# Patient Record
Sex: MALE | NOT HISPANIC OR LATINO
[De-identification: names, ages, dates, MRNs, and addresses within clinical notes are randomized per-mention and may not be internally consistent; named-entity substitution may affect disease eponyms.]

---

## 2019-06-25 ENCOUNTER — APPOINTMENT (OUTPATIENT)
Dept: ORTHOPEDIC SURGERY | Facility: CLINIC | Age: 64
End: 2019-06-25
Payer: COMMERCIAL

## 2019-06-25 DIAGNOSIS — M19.072 PRIMARY OSTEOARTHRITIS, LEFT ANKLE AND FOOT: ICD-10-CM

## 2019-06-25 PROCEDURE — 99204 OFFICE O/P NEW MOD 45 MIN: CPT

## 2019-06-25 PROCEDURE — 73610 X-RAY EXAM OF ANKLE: CPT | Mod: LT

## 2019-06-25 RX ORDER — DICLOFENAC SODIUM 100 MG/1
100 TABLET, FILM COATED, EXTENDED RELEASE ORAL
Qty: 30 | Refills: 2 | Status: ACTIVE | OUTPATIENT
Start: 2019-06-25

## 2019-06-25 NOTE — HISTORY OF PRESENT ILLNESS
[___ yrs] : [unfilled] year(s) ago [de-identified] : Patient reports pain in ankle for one year now with worsening. Pain with walking, standing, and weight bearing. Patient reports swelling.

## 2019-06-25 NOTE — DISCUSSION/SUMMARY
[Medication Risks Reviewed] : Medication risks reviewed [de-identified] : This patient will ice. He'll take diclofenac. Followup in 3 weeks. He may need an MRI. He may need an injection.

## 2019-06-25 NOTE — PHYSICAL EXAM
[de-identified] : Left ankle shows some swelling and tenderness at the talonavicular joint. He does a flat pronated foot. Neurovascular exam is normal. No tenderness over the posterior tibial tendon. [de-identified] : Left ankle x-ray shows talonavicular arthritis

## 2019-06-26 ENCOUNTER — RX RENEWAL (OUTPATIENT)
Age: 64
End: 2019-06-26

## 2019-06-26 RX ORDER — DICLOFENAC SODIUM 100 MG/1
100 TABLET, FILM COATED, EXTENDED RELEASE ORAL
Qty: 30 | Refills: 2 | Status: ACTIVE | COMMUNITY
Start: 2019-06-26 | End: 1900-01-01

## 2019-07-16 ENCOUNTER — APPOINTMENT (OUTPATIENT)
Dept: ORTHOPEDIC SURGERY | Facility: CLINIC | Age: 64
End: 2019-07-16

## 2024-05-08 ENCOUNTER — OFFICE (OUTPATIENT)
Dept: URBAN - METROPOLITAN AREA CLINIC 34 | Facility: CLINIC | Age: 69
Setting detail: OPHTHALMOLOGY
End: 2024-05-08
Payer: COMMERCIAL

## 2024-05-08 DIAGNOSIS — H25.13: ICD-10-CM

## 2024-05-08 DIAGNOSIS — H16.223: ICD-10-CM

## 2024-05-08 PROCEDURE — 99204 OFFICE O/P NEW MOD 45 MIN: CPT | Performed by: OPHTHALMOLOGY

## 2024-05-08 ASSESSMENT — LID EXAM ASSESSMENTS
OS_COMMENTS: SHORTENED GLANDS
OD_COMMENTS: SHORTENED GLANDS

## 2024-05-08 ASSESSMENT — CONFRONTATIONAL VISUAL FIELD TEST (CVF)
OS_FINDINGS: FULL
OD_FINDINGS: FULL

## 2024-06-27 ENCOUNTER — ASC (OUTPATIENT)
Dept: URBAN - METROPOLITAN AREA SURGERY 8 | Facility: SURGERY | Age: 69
Setting detail: OPHTHALMOLOGY
End: 2024-06-27
Payer: COMMERCIAL

## 2024-06-27 DIAGNOSIS — H25.11: ICD-10-CM

## 2024-06-27 PROCEDURE — 66984 XCAPSL CTRC RMVL W/O ECP: CPT | Mod: RT | Performed by: OPHTHALMOLOGY

## 2024-06-28 ENCOUNTER — OFFICE (OUTPATIENT)
Dept: URBAN - METROPOLITAN AREA CLINIC 35 | Facility: CLINIC | Age: 69
Setting detail: OPHTHALMOLOGY
End: 2024-06-28
Payer: COMMERCIAL

## 2024-06-28 DIAGNOSIS — Z96.1: ICD-10-CM

## 2024-06-28 DIAGNOSIS — H25.12: ICD-10-CM

## 2024-06-28 PROBLEM — H40.012 GLAUCOMA SUSPECT, LOW RISK 1-2 FACTORS; LEFT EYE: Status: ACTIVE | Noted: 2024-06-28

## 2024-06-28 PROCEDURE — 92136 OPHTHALMIC BIOMETRY: CPT | Mod: LT | Performed by: OPHTHALMOLOGY

## 2024-06-28 PROCEDURE — 99024 POSTOP FOLLOW-UP VISIT: CPT | Performed by: OPHTHALMOLOGY

## 2024-06-28 ASSESSMENT — CONFRONTATIONAL VISUAL FIELD TEST (CVF)
OS_FINDINGS: FULL
OD_FINDINGS: FULL

## 2024-07-05 ENCOUNTER — ASC (OUTPATIENT)
Dept: URBAN - METROPOLITAN AREA SURGERY 8 | Facility: SURGERY | Age: 69
Setting detail: OPHTHALMOLOGY
End: 2024-07-05
Payer: COMMERCIAL

## 2024-07-05 DIAGNOSIS — H25.12: ICD-10-CM

## 2024-07-05 PROCEDURE — 66984 XCAPSL CTRC RMVL W/O ECP: CPT | Mod: 79,LT | Performed by: OPHTHALMOLOGY

## 2024-07-06 ENCOUNTER — OFFICE (OUTPATIENT)
Dept: URBAN - METROPOLITAN AREA CLINIC 34 | Facility: CLINIC | Age: 69
Setting detail: OPHTHALMOLOGY
End: 2024-07-06
Payer: COMMERCIAL

## 2024-07-06 DIAGNOSIS — Z96.1: ICD-10-CM

## 2024-07-06 PROBLEM — H16.222 DRY EYE SYNDROME K SICCA;  , LEFT EYE: Status: ACTIVE | Noted: 2024-07-06

## 2024-07-06 PROCEDURE — 99024 POSTOP FOLLOW-UP VISIT: CPT | Performed by: OPHTHALMOLOGY

## 2024-07-06 ASSESSMENT — CONFRONTATIONAL VISUAL FIELD TEST (CVF)
OS_FINDINGS: FULL
OD_FINDINGS: FULL

## 2024-08-13 ENCOUNTER — DOCTOR'S OFFICE (OUTPATIENT)
Age: 69
Setting detail: OPHTHALMOLOGY
End: 2024-08-13
Payer: COMMERCIAL

## 2024-08-13 DIAGNOSIS — H16.223: ICD-10-CM

## 2024-08-13 DIAGNOSIS — Z96.1: ICD-10-CM

## 2024-08-13 DIAGNOSIS — H26.491: ICD-10-CM

## 2024-08-13 PROCEDURE — 99024 POSTOP FOLLOW-UP VISIT: CPT | Performed by: OPHTHALMOLOGY

## 2024-08-13 ASSESSMENT — CONFRONTATIONAL VISUAL FIELD TEST (CVF)
OS_FINDINGS: FULL
OD_FINDINGS: FULL